# Patient Record
Sex: MALE | Race: ASIAN | NOT HISPANIC OR LATINO | URBAN - METROPOLITAN AREA
[De-identification: names, ages, dates, MRNs, and addresses within clinical notes are randomized per-mention and may not be internally consistent; named-entity substitution may affect disease eponyms.]

---

## 2017-01-12 ENCOUNTER — OFFICE VISIT - HEALTHEAST (OUTPATIENT)
Dept: FAMILY MEDICINE | Facility: CLINIC | Age: 62
End: 2017-01-12

## 2017-01-12 DIAGNOSIS — Z13.21 SCREENING FOR ENDOCRINE, NUTRITIONAL, METABOLIC AND IMMUNITY DISORDER: ICD-10-CM

## 2017-01-12 DIAGNOSIS — Z13.29 SCREENING FOR ENDOCRINE, NUTRITIONAL, METABOLIC AND IMMUNITY DISORDER: ICD-10-CM

## 2017-01-12 DIAGNOSIS — Z12.11 SCREEN FOR COLON CANCER: ICD-10-CM

## 2017-01-12 DIAGNOSIS — Z00.00 VISIT FOR PREVENTIVE HEALTH EXAMINATION: ICD-10-CM

## 2017-01-12 DIAGNOSIS — Z13.0 SCREENING FOR ENDOCRINE, NUTRITIONAL, METABOLIC AND IMMUNITY DISORDER: ICD-10-CM

## 2017-01-12 DIAGNOSIS — Z13.228 SCREENING FOR ENDOCRINE, NUTRITIONAL, METABOLIC AND IMMUNITY DISORDER: ICD-10-CM

## 2017-01-12 DIAGNOSIS — Z23 FLU VACCINE NEED: ICD-10-CM

## 2017-01-12 LAB
CHOLEST SERPL-MCNC: 162 MG/DL
FASTING STATUS PATIENT QL REPORTED: YES
HDLC SERPL-MCNC: 47 MG/DL
LDLC SERPL CALC-MCNC: 96 MG/DL
PSA SERPL-MCNC: 1.2 NG/ML (ref 0–4.5)
TRIGL SERPL-MCNC: 95 MG/DL

## 2017-01-12 ASSESSMENT — MIFFLIN-ST. JEOR: SCORE: 1342.14

## 2017-01-13 LAB — HBA1C MFR BLD: 9 % (ref 4.2–6.1)

## 2017-01-15 ENCOUNTER — AMBULATORY - HEALTHEAST (OUTPATIENT)
Dept: FAMILY MEDICINE | Facility: CLINIC | Age: 62
End: 2017-01-15

## 2017-01-15 DIAGNOSIS — E55.9 VITAMIN D DEFICIENCY DISEASE: ICD-10-CM

## 2017-01-17 ENCOUNTER — COMMUNICATION - HEALTHEAST (OUTPATIENT)
Dept: FAMILY MEDICINE | Facility: CLINIC | Age: 62
End: 2017-01-17

## 2017-01-19 ENCOUNTER — COMMUNICATION - HEALTHEAST (OUTPATIENT)
Dept: FAMILY MEDICINE | Facility: CLINIC | Age: 62
End: 2017-01-19

## 2017-01-20 ENCOUNTER — RECORDS - HEALTHEAST (OUTPATIENT)
Dept: ADMINISTRATIVE | Facility: OTHER | Age: 62
End: 2017-01-20

## 2017-01-31 ENCOUNTER — AMBULATORY - HEALTHEAST (OUTPATIENT)
Dept: EDUCATION SERVICES | Facility: CLINIC | Age: 62
End: 2017-01-31

## 2017-01-31 DIAGNOSIS — E11.65 UNCONTROLLED TYPE 2 DIABETES MELLITUS WITH HYPERGLYCEMIA, WITHOUT LONG-TERM CURRENT USE OF INSULIN (H): ICD-10-CM

## 2017-01-31 RX ORDER — GLUCOSAMINE HCL/CHONDROITIN SU 500-400 MG
CAPSULE ORAL
Qty: 100 STRIP | Refills: 4 | Status: SHIPPED | OUTPATIENT
Start: 2017-01-31

## 2017-02-02 ENCOUNTER — RECORDS - HEALTHEAST (OUTPATIENT)
Dept: ADMINISTRATIVE | Facility: OTHER | Age: 62
End: 2017-02-02

## 2017-02-22 ENCOUNTER — RECORDS - HEALTHEAST (OUTPATIENT)
Dept: GENERAL RADIOLOGY | Facility: CLINIC | Age: 62
End: 2017-02-22

## 2017-02-22 ENCOUNTER — OFFICE VISIT - HEALTHEAST (OUTPATIENT)
Dept: FAMILY MEDICINE | Facility: CLINIC | Age: 62
End: 2017-02-22

## 2017-02-22 DIAGNOSIS — R05.9 COUGH: ICD-10-CM

## 2017-02-22 DIAGNOSIS — J20.9 ACUTE BRONCHITIS: ICD-10-CM

## 2017-02-22 ASSESSMENT — MIFFLIN-ST. JEOR: SCORE: 1324

## 2017-04-18 ENCOUNTER — AMBULATORY - HEALTHEAST (OUTPATIENT)
Dept: FAMILY MEDICINE | Facility: CLINIC | Age: 62
End: 2017-04-18

## 2017-04-18 DIAGNOSIS — E11.65 UNCONTROLLED TYPE 2 DIABETES MELLITUS WITH HYPERGLYCEMIA, WITHOUT LONG-TERM CURRENT USE OF INSULIN (H): ICD-10-CM

## 2017-04-22 ENCOUNTER — COMMUNICATION - HEALTHEAST (OUTPATIENT)
Dept: FAMILY MEDICINE | Facility: CLINIC | Age: 62
End: 2017-04-22

## 2017-04-22 DIAGNOSIS — E55.9 VITAMIN D DEFICIENCY DISEASE: ICD-10-CM

## 2017-04-27 ENCOUNTER — OFFICE VISIT - HEALTHEAST (OUTPATIENT)
Dept: FAMILY MEDICINE | Facility: CLINIC | Age: 62
End: 2017-04-27

## 2017-04-27 DIAGNOSIS — E11.65 UNCONTROLLED TYPE 2 DIABETES MELLITUS WITH HYPERGLYCEMIA, WITHOUT LONG-TERM CURRENT USE OF INSULIN (H): ICD-10-CM

## 2017-04-27 LAB
CHOLEST SERPL-MCNC: 151 MG/DL
FASTING STATUS PATIENT QL REPORTED: YES
HBA1C MFR BLD: 7.1 % (ref 3.5–6)
HDLC SERPL-MCNC: 41 MG/DL
LDLC SERPL CALC-MCNC: 96 MG/DL
TRIGL SERPL-MCNC: 72 MG/DL

## 2017-04-27 ASSESSMENT — MIFFLIN-ST. JEOR: SCORE: 1328.53

## 2017-05-19 ENCOUNTER — COMMUNICATION - HEALTHEAST (OUTPATIENT)
Dept: FAMILY MEDICINE | Facility: CLINIC | Age: 62
End: 2017-05-19

## 2017-05-19 DIAGNOSIS — E55.9 VITAMIN D DEFICIENCY DISEASE: ICD-10-CM

## 2017-05-19 RX ORDER — ERGOCALCIFEROL 1.25 MG/1
CAPSULE ORAL
Qty: 12 CAPSULE | Refills: 0 | Status: SHIPPED | OUTPATIENT
Start: 2017-05-19

## 2017-05-21 ENCOUNTER — COMMUNICATION - HEALTHEAST (OUTPATIENT)
Dept: FAMILY MEDICINE | Facility: CLINIC | Age: 62
End: 2017-05-21

## 2017-07-05 ENCOUNTER — COMMUNICATION - HEALTHEAST (OUTPATIENT)
Dept: FAMILY MEDICINE | Facility: CLINIC | Age: 62
End: 2017-07-05

## 2017-08-01 ENCOUNTER — COMMUNICATION - HEALTHEAST (OUTPATIENT)
Dept: FAMILY MEDICINE | Facility: CLINIC | Age: 62
End: 2017-08-01

## 2017-08-08 ENCOUNTER — COMMUNICATION - HEALTHEAST (OUTPATIENT)
Dept: FAMILY MEDICINE | Facility: CLINIC | Age: 62
End: 2017-08-08

## 2021-05-30 VITALS — HEIGHT: 65 IN | BODY MASS INDEX: 23.16 KG/M2 | WEIGHT: 139 LBS

## 2021-05-30 VITALS — HEIGHT: 65 IN | WEIGHT: 135 LBS | BODY MASS INDEX: 22.49 KG/M2

## 2021-05-30 VITALS — BODY MASS INDEX: 22.66 KG/M2 | HEIGHT: 65 IN | WEIGHT: 136 LBS

## 2021-05-30 VITALS — BODY MASS INDEX: 22.75 KG/M2 | WEIGHT: 138 LBS

## 2021-06-08 NOTE — PROGRESS NOTES
DIABETES CARE PLAN    Assessment/Plan:     Initial visit for diabetes education and counseling. Patient can speak and read English fairly well. He originally did not want to test his blood sugar at home. By the end of the visit he changed his mind. Demonstrated how to test blood sugar at home. Provided a One Touch Verio meter. He had some difficulty putting the lancet in and out of the lancing device. Discussed his eating habits and how to lower blood sugar naturally. Mr. Renee has not picked up the Januvia yet. He does not like to take medications. Reviewed with his elevated A1c he needs to take the Januvia. Discussed the natural progression of diabetes. He has some room to improve his eating habits to decrease carbohydrate intake.       Current Diabetes Medications:   Metformin 1000 mg twice daily  Januvia 100 mg once daily prescribed but not started    PLAN:   1. Eat a meal or a snack every 4-5 hours (2 hours between meals is too close together).  2. Consider changing from white bread to whole wheat bread.  3. Limit sweets.   4. Test blood sugar once per day at different times. Bring meter to the next visit.  5. Start Januvia as directed by Dr. Lenz (100 mg once daily).    Subjective/Objective:     Lin Renee is a 61 y.o. male referred by Chanda Lenz MD. Accompanied by:  , Love Aleman from [place]  From [facility]. Travels a lot    Wt Readings from Last 3 Encounters:   01/31/17 138 lb (62.6 kg)   01/12/17 139 lb (63 kg)     Food Recall: Eats 3-5 meals per day and no snacks. Likes to eat sweets. Limited juice. No sugar soda.  6:00 am Breakfast: coffee with milk, egg and 2-3 pieces of white bread  9-10:00 am wife gets up and he eats again. Cereal and banana or grapefruit  12:00 Lunch: 1 cup of white Rice or 1.5 cups of noodles, beef  SWIMS after lunch  2:00 pm Vegetables  6:00 pm Dinner: Spaghetti or rice, meat, vegetables    Activity: Swimming 3-4 times per week for 45 minutes    SMBG  pattern/BG ranges: Given a One Touch Verio meter  Postprandial BG today: 190    BG goals: Before meals ; 2 hours after meals: less 180; Bedtime: 120-140    Lab Results   Component Value Date    HGBA1C 9.0 (H) 01/12/2017       EDUCATION RECORD      Monitoring   Meter: Discussed  Monitoring: Discussed and Literature provided  BG goals: Discussed and Literature provided    Nutrition Management  Nutrition Management: Discussed and Literature provided  Weight: Assessed and Discussed  Portions/Balance: Assessed and Discussed  Foods that raise blood sugar: Discussed  Physical Activity: Discussed and Literature provided  Medications: Discussed    Acute Complications: Prevent, Detect, Treat:  Hypoglycemia: Discussed and Literature provided  Hyperglycemia: Discussed and Literature provided  ABC: Discussed  Goal Setting and Problem Solving: Discussed and Literature provided  Barriers: Assessed Language (Chinese, Mandarin)  Psychosocial Adjustments: Assessed      Time spent with the patient: 60 minutes   Visit Type:Medical Nutrition Therapy, Initial  ([id number])  Diagnosis per referral:Type 2 Diabetes, uncontrolled E11.65    Tyra Lutz RD, LD, CDE  1/31/2017  9:33 AM

## 2021-06-08 NOTE — PROGRESS NOTES
ASSESSMENT & PLAN:  1. Visit for preventive health examination  2. Diabetes type 2, uncontrolled  Currently in control,  Will increase metformin to 1000 mg twice a day, and add a second agent.  He was referred to see a diabetes educator and to schedul a diabetes eye exam.  - Comprehensive Metabolic Panel  - Glycosylated Hemoglobin A1c  - High Sensitivity C-Reactive Protein(hsCR  - Lipid Cascade RANDOM  - Thyroid Stimulating Hormone (TSH)  - Ambulatory referral to Ophthalmology  - Ambulatory referral to Diabetes Education (Existing Diagnosis)  - Microalbumin, Random Urine    3. Screening for endocrine, nutritional, metabolic and immunity disorder  - Lipid Cascade RANDOM  - Vitamin D, Total (25-Hydroxy)  - Thyroid Stimulating Hormone (TSH)  - PSA (Prostatic-Specific Antigen), Annual Screen    4. Screen for colon cancer  - Ambulatory referral for Colonoscopy  5. Flu vaccine need  - Influenza, Seasonal Quad, Preservative Free 36+ Months    Patient Instructions   Make an appointment with the eye doctor.  Make an appointment for colonoscopy.  Meet with the diabetes educator to discuss how to manage the diabetes.  Return to see me in 3 months.      Orders Placed This Encounter   Procedures     Influenza, Seasonal Quad, Preservative Free 36+ Months     Comprehensive Metabolic Panel     Glycosylated Hemoglobin A1c     High Sensitivity C-Reactive Protein(hsCR     Lipid Cascade RANDOM     Order Specific Question:   Fasting is required?     Answer:   No     Vitamin D, Total (25-Hydroxy)     Thyroid Stimulating Hormone (TSH)     PSA (Prostatic-Specific Antigen), Annual Screen     Microalbumin, Random Urine     Ambulatory referral to Ophthalmology     Referral Priority:   Routine     Referral Type:   Consultation     Referral Reason:   Evaluation and Treatment     Requested Specialty:   Ophthalmology     Number of Visits Requested:   1     Ambulatory referral to Diabetes Education (Existing Diagnosis)     Referral Priority:    Routine     Referral Type:   Consultation     Referral Reason:   Evaluation and Treatment     Number of Visits Requested:   1     Ambulatory referral for Colonoscopy     Referral Priority:   Routine     Referral Type:   Colonoscopy     Referral Reason:   Evaluation and Treatment     Requested Specialty:   Gastroenterology     Number of Visits Requested:   1     There are no discontinued medications.    No Follow-up on file.    Patient Counseling:  -Nutrition: Stressed importance of moderation in sodium/caffeine intake, saturated fat and cholesterol, caloric balance, sufficient intake of fresh fruits, vegetables, fiber, calcium, iron.  -Exercise: Stressed the importance of regular exercise.   -Dental health: Discussed importance of regular tooth brushing, flossing, and dental visits.  -Immunizations reviewed.  -Discussed timing and benefits of screening colonoscopy and alternative options.  -After hours service discussed with patient      CHIEF COMPLAINT:  Chief Complaint   Patient presents with     Annual Exam     Establish Care     NEW PT     Labs Only     A1C/CHOLESTEROL/URINE       HISTORY OF PRESENT ILLNESS:  Lin is a 61 y.o. male new patient accompanied by an  presenting to the clinic today for an annual physical exam and to establish care.    Diabetes: He was diagnosed with diabetes in [facility] and was prescribed metformin 500 mg twice daily. He does not check his blood glucose at home. In 10/07/16 his blood glucose was 174 and the A1c was 8.7. His father had diabetes.    Health Maintenance: He had a colonoscopy 2 years ago in [facility], which was normal. He does not have the records with him. He received vaccinations when he applied for a green card. He received the influenza vaccine today.    REVIEW OF SYSTEMS:   He denies frequent nighttime urination and urethral discharge. All other systems are negative.    PFSH:  Healthy Habits:   Regular Exercise: He swims 3-4 times per week for 30  "years.  Dental Visits Regularly: He is looking to establish care with a dentist.  Seat Belt use: Yes.  Guns in Home: No.  Helmet Use: Yes.    Family History:  Family History   Problem Relation Age of Onset     Diabetes Father        Social History:  He moved to [place] last year with his wife from [facility] to be closer to their daughter and grandson.  He retired as a .  Social History     Social History     Marital status:      Spouse name: N/A     Number of children: N/A     Years of education: N/A     Occupational History     Not on file.     Social History Main Topics     Smoking status: Never Smoker     Smokeless tobacco: Not on file     Alcohol use Not on file     Drug use: Not on file     Sexual activity: Not on file     Other Topics Concern     Not on file     Social History Narrative     No narrative on file       Surgical History:  Past Surgical History   Procedure Laterality Date     Cataract surgery Bilateral 09/2016       Allergies:  No known drug allergies.    Problem List:  Active Ambulatory Problems     Diagnosis Date Noted     No Active Ambulatory Problems     Resolved Ambulatory Problems     Diagnosis Date Noted     No Resolved Ambulatory Problems     Past Medical History   Diagnosis Date     Cataracts, bilateral      Diabetes 10/2016       VITALS:  Vitals:    01/12/17 0930   BP: 104/60   Patient Site: Right Arm   Pulse: 60   Resp: 13   Temp: 98.2  F (36.8  C)   TempSrc: Oral   Weight: 139 lb (63 kg)   Height: 5' 5.3\" (1.659 m)     Wt Readings from Last 3 Encounters:   01/12/17 139 lb (63 kg)     Body mass index is 22.92 kg/(m^2).    PHYSICAL EXAM:  General Appearance: Alert, cooperative, no distress, appears stated age  Head: Normocephalic, without obvious abnormality, atraumatic  Eyes: PERRL, conjunctiva/corneas clear, EOM's intact, fundoscopic exam normal.  Ears: Normal TM's and external ear canals, both ears. Dry ear wax bilaterally.  Nose: Nares normal, septum midline, " mucosa normal, no drainage  Throat: Lips, mucosa, and tongue normal; teeth and gums normal  Neck: Supple, symmetrical, trachea midline, no adenopathy; thyroid: not enlarged, symmetric, no tenderness/mass/nodules  Lymph nodes: Cervical, supraclavicular, and axillary nodes normal  Back: Symmetric, no curvature, ROM normal  Lungs: Clear to auscultation bilaterally, respirations unlabored  Heart: Regular rate and rhythm, S1 and S2 normal, no murmur, rub, or gallop  Abdomen: Soft, non-tender, bowel sounds active all four quadrants,  no masses, no organomegaly  Genitourinary: Penis normal. Right and left testis are descended. No palpable masses.   Rectal: No visible external lesion  Musculoskeletal: Normal range of motion. No joint swelling or deformity.   Extremities: Extremities normal, atraumatic, no cyanosis or edema  Skin: Skin color, texture, turgor normal, no rashes or lesions  Neurologic: He is alert. Negative monofilament test.  Psychiatric: He has a normal mood and affect.     ADDITIONAL HISTORY SUMMARIZED (2): None.  DECISION TO OBTAIN EXTRA INFORMATION (1): Asked for medical records from [facility].   RADIOLOGY TESTS (1): None.  LABS (1): Labs ordered today.  MEDICINE TESTS (1): Colonoscopy ordered today.  INDEPENDENT REVIEW (2 each): None.     The visit lasted a total of 25 minutes face to face with the patient. Over 50% of the time was spent counseling and educating the patient about diet, exercise, immunizations, diabetes, colonoscopy.    IAngelina, am scribing for and in the presence of, Dr. Lenz.    I, Dr. Lenz, personally performed the services described in this documentation, as scribed by Angelina Marcial in my presence, and it is both accurate and complete.    Dragon dictation was used for this note.  Speech recognition errors are a possibility.    MEDICATIONS:  Current Outpatient Prescriptions   Medication Sig Dispense Refill     metFORMIN (GLUCOPHAGE) 500 MG tablet Take 500 mg by mouth 2  (two) times a day with meals.       No current facility-administered medications for this visit.        Total data points: 3

## 2021-06-09 NOTE — PROGRESS NOTES
ASSESSMENT & PLAN:  1. Acute bronchitis  - XR Chest PA and Lateral; Future  - azithromycin (ZITHROMAX Z-ELIO) 250 MG tablet; 2 tabs (500 mg ) day #1, then 1 tab (250 mg) days #2-5, total 5 days  Dispense: 6 tablet; Refill: 0  Patient Instructions   Take the azithromycin as prescribed. Take 2 pills on the first day and then 1 pill for the next 4 days for a total of 5 days. Possible side effects may be GI discomfort or diarrhea. Taking the antibiotic with food and probiotics may decrease the side effects.  Use Flonase for the nasal congestion.  Continue with symptomatic management: Drink plenty of fluids, take plenty of vitamin C, take Tylenol or ibuprofen as needed.  Return to clinic if symptoms persist or worsen.        Orders Placed This Encounter   Procedures     XR Chest PA and Lateral     Standing Status:   Future     Number of Occurrences:   1     Standing Expiration Date:   2/23/2018     Order Specific Question:   Reason for Exam (Describe Symptoms):     Answer:   COUGH X 1 MONTH     Order Specific Question:   Can the procedure be changed per Radiologist protocol?     Answer:   Yes     There are no discontinued medications.    No Follow-up on file.    CHIEF COMPLAINT:  Chief Complaint   Patient presents with     URI     X 1 MTH     Cough     X 1 MTH     Wheezing     X 1 MTH - WORSE AM     Nasal Congestion       HISTORY OF PRESENT ILLNESS:  Lin is a 61 y.o. male accompanied by an  presenting to the clinic today with productive cough, wheezing, and nasal congestion x 1 month. Sleep has decreased due to the cough. He denies fever and SOB. The sputum is a dark yellow color. He is drinking hot water and taking cough syrup with no relief. His wife was also ill, but her symptoms have resolved.    REVIEW OF SYSTEMS:   He does not test his blood sugar at home. All other systems are negative.    PFSH:  Tobacco Use:  History   Smoking Status     Never Smoker   Smokeless Tobacco     Not on file  "      VITALS:  Vitals:    02/22/17 1042   BP: 112/68   Patient Site: Right Arm   Pulse: 76   Resp: 14   Temp: 98.1  F (36.7  C)   TempSrc: Oral   SpO2: 96%   Weight: 135 lb (61.2 kg)   Height: 5' 5.3\" (1.659 m)     Wt Readings from Last 3 Encounters:   02/22/17 135 lb (61.2 kg)   01/31/17 138 lb (62.6 kg)   01/12/17 139 lb (63 kg)     Body mass index is 22.26 kg/(m^2).    PHYSICAL EXAM:  General:  Patient alert, in no acute distress.   Nose:  Nasal congestion.  Throat:  Oropharynx normal.  Neck:  Supple, without thyromegaly or mass, no adenopathies.  Lymphatic: Normal palpation of neck.  No lymphadenopathy.  No bruising.  Resp:  Distant bibasilar lung sounds with no obvious rales, rhonchi, or wheezing.  Normal respiratory effort.   CV:  Regular rate and rhythm without murmurs, rubs or gallops.  Neuro:  CN II-XII intact.  Psychiatric:  Alert & oriented x 3.    CXR:  No acute findings.    ADDITIONAL HISTORY SUMMARIZED (2): None.  DECISION TO OBTAIN EXTRA INFORMATION (1): None.   RADIOLOGY TESTS (1): CXR ordered.  LABS (1): None.  MEDICINE TESTS (1): None.  INDEPENDENT REVIEW (2 each): CXR interpreted.     The visit lasted a total of 17 minutes face to face with the patient. Over 50% of the time was spent counseling and educating the patient about bronchitis.    IAngelina, am scribing for and in the presence of, Dr. Lenz.    IDr. Lenz, personally performed the services described in this documentation, as scribed by Angelina Marcial in my presence, and it is both accurate and complete.    Dragon dictation was used for this note.  Speech recognition errors are a possibility.    MEDICATIONS:  Current Outpatient Prescriptions   Medication Sig Dispense Refill     blood glucose test strips Use 1 test strip with your Verio meter to check blood sugar 1 times daily 100 strip 4     blood-glucose meter (ONETOUCH VERIO SYSTEM) Misc Use the Verio meter as directed to check blood sugar. Sample meter provided in the " clinic 1 each 0     ergocalciferol (ERGOCALCIFEROL) 50,000 unit capsule Take 1 capsule (50,000 Units total) by mouth once a week for 12 doses. 12 capsule 0     lancets 30 gauge Misc Use 1 each As Directed daily. Use one lancet with your Verio meter to check blood sugar as directed 100 each 3     metFORMIN (GLUCOPHAGE) 1000 MG tablet Take 1 tablet (1,000 mg total) by mouth 2 (two) times a day with meals. 180 tablet 3     sitaGLIPtin (JANUVIA) 100 MG tablet Take 1 tablet (100 mg total) by mouth daily. With or without food 30 tablet 5     azithromycin (ZITHROMAX Z-ELIO) 250 MG tablet 2 tabs (500 mg ) day #1, then 1 tab (250 mg) days #2-5, total 5 days 6 tablet 0     No current facility-administered medications for this visit.        Total data points: 3

## 2021-06-10 NOTE — PROGRESS NOTES
ASSESSMENT & PLAN:  1. Uncontrolled type 2 diabetes mellitus with hyperglycemia, without long-term current use of insulin  Improved A1c compared to last visit, continue current medical treatment, encouraged patient to learn how to check his blood sugar and to start doing it at least 3 times a week and in the future hopefully he will  be able to do that daily.  - Glycosylated Hemoglobin A1c  - Lipid Cascade RANDOM  - Comprehensive Metabolic Panel    No orders of the defined types were placed in this encounter.    There are no discontinued medications.    No Follow-up on file.    CHIEF COMPLAINT:  Chief Complaint   Patient presents with     Diabetes     F/U     Cough     X 1 WK     Nasal Congestion     X 1 WK       HISTORY OF PRESENT ILLNESS:  Lin is a 61 y.o. male presenting to the clinic today for a diabetic check and with complaints of a cough. He is accompanied by an .     Diabetes: He does not check his blood sugar when he is at home. He has the glucometer, but he is not comfortable using it. He has been taking 1000 mg of metformin twice daily and 100 mg of Januvia daily since the last time he was seen. His last hemoglobin A1c was 9.0% on 1/12/2017. His hemoglobin A1c is down to 7.1% today. He had his eyes checked recently, and they looked good. He will continue to work on diet and exercise on top of taking the medications regularly.     Cough: He has had a cough and nasal congestion for the past week. He inquires if there is any medication he could take for this, whether it be prescribed or over the counter. It was discussed that his lungs sound good and that it is likely viral, thus not requiring an antibiotic.     REVIEW OF SYSTEMS:   He denies blurry vision. All other systems are negative.    PFSH:  Reviewed, as below.     TOBACCO USE:  History   Smoking Status     Never Smoker   Smokeless Tobacco     Not on file       VITALS:  Vitals:    04/27/17 0854   BP: 132/60   Patient Site: Right Arm  "  Pulse: 72   Resp: 14   Temp: 97.7  F (36.5  C)   TempSrc: Oral   Weight: 136 lb (61.7 kg)   Height: 5' 5.3\" (1.659 m)     Wt Readings from Last 3 Encounters:   04/27/17 136 lb (61.7 kg)   02/22/17 135 lb (61.2 kg)   01/31/17 138 lb (62.6 kg)     Body mass index is 22.42 kg/(m^2).    PHYSICAL EXAM:  GENERAL:  Patient alert, in no acute distress.  EYES: Normal eye exam, no hemorrhages, no narrowing of vessels.   RESP:  Clear to auscultation without crackles, wheezes or distress.  Normal respiratory effort.   CV:  Regular rate and rhythm without murmurs, rubs or gallops.  Normal pedal pulses.  No varicosities or edema.  NEURO:  CN II-XII intact, motor & sensory function all intact.  DTR and reflexes normal.  PSYCHIATRIC:  Alert & oriented with normal mood and affect.  Good judgment and insight.  Diabetic Foot Exam: Good monofilament sensation      ADDITIONAL HISTORY SUMMARIZED (2): None.  DECISION TO OBTAIN EXTRA INFORMATION (1): None.   RADIOLOGY TESTS (1): None.  LABS (1): Labs from 1/12/2017 reviewed. Labs ordered.   MEDICINE TESTS (1): None.  INDEPENDENT REVIEW (2 each): None.     The visit lasted a total of 10 minutes face to face with the patient. Over 50% of the time was spent counseling and educating the patient about his diabetes.    IOmari, am scribing for and in the presence of, Dr. Lenz.    I, Dr. Lenz, personally performed the services described in this documentation, as scribed by Omari Richey in my presence, and it is both accurate and complete.    Dragon dictation was used for this note.  Speech recognition errors are a possibility.    MEDICATIONS:  Current Outpatient Prescriptions   Medication Sig Dispense Refill     blood glucose test strips Use 1 test strip with your Verio meter to check blood sugar 1 times daily 100 strip 4     blood-glucose meter (ONETOUCH VERIO SYSTEM) Misc Use the Verio meter as directed to check blood sugar. Sample meter provided in the clinic 1 each 0     lancets " 30 gauge Misc Use 1 each As Directed daily. Use one lancet with your Verio meter to check blood sugar as directed 100 each 3     metFORMIN (GLUCOPHAGE) 1000 MG tablet Take 1 tablet (1,000 mg total) by mouth 2 (two) times a day with meals. 180 tablet 3     sitaGLIPtin (JANUVIA) 100 MG tablet Take 1 tablet (100 mg total) by mouth daily. With or without food 30 tablet 5     VITAMIN D2 50,000 unit capsule TAKE 1 CAPSULE (50,000 UNITS TOTAL) BY MOUTH ONCE A WEEK FOR 12 DOSES. 12 capsule 0     No current facility-administered medications for this visit.        Total data points: 1      There are no Patient Instructions on file for this visit.

## 2021-06-15 PROBLEM — E55.9 VITAMIN D DEFICIENCY DISEASE: Status: ACTIVE | Noted: 2017-01-15
